# Patient Record
Sex: FEMALE | Race: WHITE | Employment: UNEMPLOYED | URBAN - METROPOLITAN AREA
[De-identification: names, ages, dates, MRNs, and addresses within clinical notes are randomized per-mention and may not be internally consistent; named-entity substitution may affect disease eponyms.]

---

## 2017-01-22 PROBLEM — E89.41 SURGICAL MENOPAUSE, SYMPTOMATIC: Status: ACTIVE | Noted: 2017-01-22

## 2017-01-22 PROBLEM — G89.29 CHRONIC INTRACTABLE HEADACHE: Status: ACTIVE | Noted: 2017-01-22

## 2017-01-22 PROBLEM — R51.9 CHRONIC INTRACTABLE HEADACHE: Status: ACTIVE | Noted: 2017-01-22

## 2017-01-22 PROBLEM — F41.9 ANXIETY: Status: ACTIVE | Noted: 2017-01-22

## 2017-01-22 PROBLEM — G93.2 PSEUDOTUMOR CEREBRI: Status: ACTIVE | Noted: 2017-01-22

## 2017-05-11 ENCOUNTER — ANESTHESIA (OUTPATIENT)
Dept: ENDOSCOPY | Age: 32
End: 2017-05-11
Payer: COMMERCIAL

## 2017-05-11 ENCOUNTER — ANESTHESIA EVENT (OUTPATIENT)
Dept: ENDOSCOPY | Age: 32
End: 2017-05-11
Payer: COMMERCIAL

## 2017-05-11 ENCOUNTER — HOSPITAL ENCOUNTER (OUTPATIENT)
Age: 32
Setting detail: OUTPATIENT SURGERY
Discharge: HOME | End: 2017-05-11
Attending: SURGERY | Admitting: SURGERY
Payer: COMMERCIAL

## 2017-05-11 VITALS
TEMPERATURE: 98.6 F | OXYGEN SATURATION: 95 % | HEIGHT: 68 IN | DIASTOLIC BLOOD PRESSURE: 86 MMHG | BODY MASS INDEX: 33.95 KG/M2 | RESPIRATION RATE: 14 BRPM | WEIGHT: 224 LBS | SYSTOLIC BLOOD PRESSURE: 113 MMHG | HEART RATE: 82 BPM

## 2017-05-11 PROCEDURE — 74011250636 HC RX REV CODE- 250/636: Performed by: ANESTHESIOLOGY

## 2017-05-11 PROCEDURE — 76060000032 HC ANESTHESIA 0.5 TO 1 HR: Performed by: SURGERY

## 2017-05-11 PROCEDURE — 74011250636 HC RX REV CODE- 250/636

## 2017-05-11 PROCEDURE — 76040000025: Performed by: SURGERY

## 2017-05-11 RX ORDER — PROPOFOL 10 MG/ML
INJECTION, EMULSION INTRAVENOUS AS NEEDED
Status: DISCONTINUED | OUTPATIENT
Start: 2017-05-11 | End: 2017-05-11 | Stop reason: HOSPADM

## 2017-05-11 RX ORDER — SODIUM CHLORIDE, SODIUM LACTATE, POTASSIUM CHLORIDE, CALCIUM CHLORIDE 600; 310; 30; 20 MG/100ML; MG/100ML; MG/100ML; MG/100ML
1000 INJECTION, SOLUTION INTRAVENOUS CONTINUOUS
Status: DISCONTINUED | OUTPATIENT
Start: 2017-05-11 | End: 2017-05-11 | Stop reason: HOSPADM

## 2017-05-11 RX ORDER — PROPOFOL 10 MG/ML
INJECTION, EMULSION INTRAVENOUS
Status: DISCONTINUED | OUTPATIENT
Start: 2017-05-11 | End: 2017-05-11 | Stop reason: HOSPADM

## 2017-05-11 RX ADMIN — PROPOFOL 200 MCG/KG/MIN: 10 INJECTION, EMULSION INTRAVENOUS at 14:05

## 2017-05-11 RX ADMIN — PROPOFOL 120 MG: 10 INJECTION, EMULSION INTRAVENOUS at 14:05

## 2017-05-11 RX ADMIN — SODIUM CHLORIDE, SODIUM LACTATE, POTASSIUM CHLORIDE, AND CALCIUM CHLORIDE: 600; 310; 30; 20 INJECTION, SOLUTION INTRAVENOUS at 14:00

## 2017-05-11 RX ADMIN — SODIUM CHLORIDE, SODIUM LACTATE, POTASSIUM CHLORIDE, AND CALCIUM CHLORIDE 1000 ML: 600; 310; 30; 20 INJECTION, SOLUTION INTRAVENOUS at 13:13

## 2017-05-11 NOTE — IP AVS SNAPSHOT
Joanna Foil 
 
 
 [address] [facility] 
[phone] Patient: Ismael Guillen MRN: [id number] EKA:[id number] You are allergic to the following No active allergies Recent Documentation Height Weight Breastfeeding? BMI OB Status Smoking Status 1.727 m 101.6 kg No 34.06 kg/m2 Hysterectomy Never Smoker Emergency Contacts Name Discharge Info Relation Home Work Mobile [facility] DISCHARGE CAREGIVER [3] Spouse [3] [phone] About your hospitalization You were admitted on:  May 11, 2017 You last received care in the:  [facility] You were discharged on:  May 11, 2017 Unit phone number:  [phone] Why you were hospitalized Your primary diagnosis was:  Not on File Providers Seen During Your Hospitalizations Provider Role Specialty Primary office phone Candy Burroughs DO Attending Provider General Surgery [phone] Your Primary Care Physician (PCP) Primary Care Physician Office Phone Office Fax 9115 [address], [address] [phone] Follow-up Information None Your Appointments Wednesday May 24, 2017 10:45 AM EDT  
EMG with MD No Sidhu MD ([address]) [address] [place] [address]  
[phone] Current Discharge Medication List  
  
ASK your doctor about these medications Dose & Instructions Dispensing Information Comments Morning Noon Evening Bedtime  
 butalbital-acetaminophen-caffeine -40 mg per tablet Commonly known as:  Costella Jeans Your last dose was: Your next dose is:    
   
   
 Dose:  1 Tab Take 1 Tab by mouth every six (6) hours as needed for Pain. Max Daily Amount: 4 Tabs. Quantity:  20 Tab Refills:  0  
     
   
   
   
  
 fluconazole 150 mg tablet Commonly known as:  DIFLUCAN Your last dose was: Your next dose is:    
   
   
 Dose:  150 mg Take 1 Tab by mouth daily for 7 days. FDA advises cautious prescribing of oral fluconazole in pregnancy. Quantity:  7 Tab Refills:  0  
     
   
   
   
  
 furosemide 20 mg tablet Commonly known as:  LASIX Your last dose was: Your next dose is:    
   
   
 take 1 tablet by mouth three times a day Refills:  0  
     
   
   
   
  
 hydrocortisone 25 mg Supp Commonly known as:  ANUSOL-HC Your last dose was: Your next dose is:    
   
   
 Dose:  25 mg Insert 1 Suppository into rectum three (3) times daily. Quantity:  21 Suppository Refills:  0 KLOR-CON 10 10 mEq tablet Generic drug:  potassium chloride SR Your last dose was: Your next dose is:    
   
   
 Dose:  10 mEq Take 10 mEq by mouth nightly. Refills:  0  
     
   
   
   
  
 mupirocin calcium 2 % topical cream  
Commonly known as:  [employer] Your last dose was: Your next dose is:    
   
   
 Apply  to affected area two (2) times a day. Quantity:  30 g Refills:  5  
     
   
   
   
  
 nystatin powder Commonly known as:  NYSTOP Your last dose was: Your next dose is:    
   
   
 Apply  to affected area three (3) times daily. Quantity:  60 g Refills:  11  
     
   
   
   
  
 oxyCODONE IR 5 mg immediate release tablet Commonly known as:  Joanne Griggs Your last dose was: Your next dose is:    
   
   
 Dose:  5 mg Take 1 Tab by mouth two (2) times daily as needed for Pain. Max Daily Amount: 10 mg.  
 Quantity:  60 Tab Refills:  0 PREMARIN 1.25 mg tablet Generic drug:  conjugated estrogens Your last dose was: Your next dose is:    
   
   
 Dose:  1.25 mg Take 1 Tab by mouth daily. Quantity:  90 Tab Refills:  1 PriLOSEC 20 mg capsule Generic drug:  omeprazole Your last dose was: Your next dose is:    
   
   
 Dose:  20 mg Take 20 mg by mouth as needed. Refills:  0  
     
   
   
   
  
 promethazine 25 mg tablet Commonly known as:  PHENERGAN Your last dose was: Your next dose is:    
   
   
 Dose:  25 mg Take 1 Tab by mouth every six (6) hours as needed for Nausea. Quantity:  30 Tab Refills:  2 Discharge Instructions Gastrointestinal Colonoscopy/Flexible Sigmoidoscopy - Lower Exam Discharge Instructions 1. Call Dr. Danielle Ordoñez for any problems or questions. 2. Contact the doctors office for follow up appointment as directed 3. Medication may cause drowsiness for several hours, therefore, do not drive or operate machinery for remainder of the day. 4. No alcohol today. 5. Ordinarily, you may resume regular diet and activity after exam unless otherwise specified by your physician. 6. Because of air put into your colon during exam, you may experience some abdominal distension, relieved by the passage of gas, for several hours. 7. Contact your physician if you have any of the following: 
a. Excessive amount of bleeding  large amount when having a bowel movement. Occasional specks of blood with bowel movement would not be unusual. 
b. Severe abdominal pain 
c. Fever or Chills Instructions given to Ginger Sudhakar and other family members. Instructions given by:  Reji Guevara RN Discharge Orders None Introducing [facility]! [employer] introduces [email or web address] patient portal. Now you can access parts of your medical record, email your doctor's office, and request medication refills online. 1. In your internet browser, go to [email or web address] 2. Click on the First Time User? Click Here link in the Sign In box. You will see the New Member Sign Up page. 3. Enter your [employer] Access Code exactly as it appears below. You will not need to use this code after youve completed the sign-up process. If you do not sign up before the expiration date, you must request a new code. · [employer] Access Code: [email or web address] Expires: 7/30/2017  2:03 PM 
 
4. Enter the last four digits of your Social Security Number (xxxx) and Date of Birth (mm/dd/yyyy) as indicated and click Submit. You will be taken to the next sign-up page. 5. Create a [employer] ID. This will be your [employer] login ID and cannot be changed, so think of one that is secure and easy to remember. 6. Create a [employer] password. You can change your password at any time. 7. Enter your Password Reset Question and Answer. This can be used at a later time if you forget your password. 8. Enter your e-mail address. You will receive e-mail notification when new information is available in [address] 9. Click Sign Up. You can now view and download portions of your medical record. 10. Click the Download Summary menu link to download a portable copy of your medical information. If you have questions, please visit the Frequently Asked Questions section of the [employer] website. Remember, [employer] is NOT to be used for urgent needs. For medical emergencies, dial 911. Now available from your iPhone and Android! General Information Please provide this summary of care documentation to your next provider. Patient Signature:  ____________________________________________________________ Date:  ____________________________________________________________  
  
Laureano Willard Provider Signature:  ____________________________________________________________ Date:  ____________________________________________________________

## 2017-05-11 NOTE — PROCEDURES
Procedure in Detail:  Informed consent was obtained for the procedure. The patient was placed in the left lateral decubitus position and sedation was induced by anesthesia. The [id number] was inserted into the rectum and advanced under direct vision to the cecum, which was identified by the ileocecal valve and appendiceal orifice. The quality of the colonic preparation was excellent. A careful inspection was made as the colonoscope was withdrawn, including a retroflexed view of the rectum; findings and interventions are described below. Appropriate photodocumentation was obtained. Findings:   ANUS: Anal exam reveals no masses or hemorrhoids, sphincter tone is normal.   RECTUM: Rectal exam reveals no masses or hemorrhoids. SIGMOID COLON: The mucosa is normal with good vascular pattern and without ulcers, diverticula, and polyps. DESCENDING COLON: The mucosa is normal with good vascular pattern and without ulcers, diverticula, and polyps. SPLENIC FLEXURE: The splenic flexure is normal.   TRANSVERSE COLON: The mucosa is normal with good vascular pattern and without ulcers, diverticula, and polyps. HEPATIC FLEXURE: The hepatic flexure is normal.   ASCENDING COLON: The mucosa is normal with good vascular pattern and without ulcers, diverticula, and polyps. CECUM: The appendiceal orifice appears normal. The ileocecal valve appears normal.   TERMINAL ILEUM: The terminal ileum was not entered. Grade one hemorrhoids without bleeding. Specimens: No specimens were collected. Complications: None; patient tolerated the procedure well. \    EBL - none    Recommendations:   - Follow clinical symptoms and laboratory studies for evidence of rebleeding.    - Follow up with primary care physician.      Signed By: Neeta Enriquez DO                        May 11, 2017

## 2017-05-11 NOTE — ANESTHESIA PREPROCEDURE EVALUATION
Anesthetic History   No history of anesthetic complications            Review of Systems / Medical History  Patient summary reviewed, nursing notes reviewed and pertinent labs reviewed    Pulmonary  Within defined limits                 Neuro/Psych   Within defined limits           Cardiovascular                  Exercise tolerance: >4 METS     GI/Hepatic/Renal     GERD: well controlled           Endo/Other  Within defined limits           Other Findings              Physical Exam    Airway  Mallampati: II  TM Distance: 4 - 6 cm  Neck ROM: normal range of motion   Mouth opening: Normal     Cardiovascular    Rhythm: regular           Dental  No notable dental hx       Pulmonary  Breath sounds clear to auscultation               Abdominal         Other Findings            Anesthetic Plan    ASA: 2  Anesthesia type: total IV anesthesia            Anesthetic plan and risks discussed with: Patient

## 2017-05-11 NOTE — H&P
Benjamin Sandhu DO  [address], [address] [facility], [address] [address]  Phone [phone] Fax [phone]        Date of visit: 17       Primary/Requesting provider: Kira Santos MD          Chief Complaint   Patient presents with    New Patient       rectal bleeding                    Date:17          Name: Tori Tripp    MRN: [id number]   : 1985    Age: 29 y.o. Sex: female      PCP: Kira Santos MD         CC:        Chief Complaint   Patient presents with    New Patient       rectal bleeding          HPI:     Tori Tripp is a 29 y.o. female who presents for evaluation for rectal bleeding. This is a young healthy 82-year-old complaining of rectal bleeding for 8 months. She has a history of urinary incontinence and has had workup and evaluation including cystoscopies and colonoscopy in . She reports no abnormalities on the colonoscopy. She states that for the past 8 months she has been bleeding 3-4 times a week while having bowel movements. She reports the blood is mostly seen on the tissue paper but not as much in the toilet water. She has had no jeffry blood or clots noted in the toilet water. She denies any signs or symptoms of anemia including postural hypertension dizziness or syncopal episodes. She reports hemorrhoids when she was pregnant but does not have any complaints of hemorrhoids on today's evaluation. She denies rectal pain or burning. She does complain of cramping that resolves after bowel movement. She reports a normal appetite she denies any changes in her bowel habits specifically denying constipation and diarrhea. She denies any unusual weight loss.  She denies any fevers nausea or vomiting.     PMH:          Past Medical History:   Diagnosis Date    Allergic rhinitis      Anxiety      Depression      GERD (gastroesophageal reflux disease)      History of peptic ulcer      Migraines      Personal history of urinary calculi         PSH:           Past Surgical History:   Procedure Laterality Date    HX CHOLECYSTECTOMY   2013    HX COLONOSCOPY   2009     [place]--no polyps    HX HYSTERECTOMY   2012     partial    HX LAP CHOLECYSTECTOMY        HX PARTIAL HYSTERECTOMY         with left oophorectomy         MEDS:          Current Outpatient Prescriptions   Medication Sig    hydrocortisone (ANUSOL-HC) 25 mg supp Insert 1 Suppository into rectum three (3) times daily.  PREMARIN 1.25 mg tablet      diazePAM (VALIUM) 5 mg tablet Take 1 Tab by mouth every twelve (12) hours as needed for Anxiety. Max Daily Amount: 10 mg.    [START ON 4/3/2017] oxyCODONE IR (ROXICODONE) 5 mg immediate release tablet Take 1 Tab by mouth two (2) times daily as needed for Pain. Max Daily Amount: 10 mg.    gabapentin (NEURONTIN) 100 mg capsule Take by mouth two (2) times a day.  potassium chloride SR (KLOR-CON 10) 10 mEq tablet Take 10 mEq by mouth daily.  promethazine (PHENERGAN) 25 mg tablet Take 1 Tab by mouth every six (6) hours as needed for Nausea.  clotrimazole-betamethasone (LOTRISONE) topical cream Apply to affected area three (3) times daily.  butalbital-acetaminophen-caffeine (FIORICET, ESGIC) -40 mg per tablet Take 1 Tab by mouth every six (6) hours as needed for Pain. Max Daily Amount: 4 Tabs.  montelukast (SINGULAIR) 10 mg tablet Take 10 mg by mouth daily.  tiotropium bromide (SPIRIVA RESPIMAT) 2.5 mcg/actuation mist Take 2 Puffs by inhalation daily.  fluticasone-vilanterol (BREO ELLIPTA) 200-25 mcg/dose dsdv Take 1 Puff by inhalation daily.      No current facility-administered medications for this visit.          ALLERGIES:      No Known Allergies     SH:          Social History   Substance Use Topics    Smoking status: Never Smoker    Smokeless tobacco: Never Used    Alcohol use No         FH:     No family history on file.        Review of Systems   Constitutional: Negative. HENT: Negative.    Eyes: Negative. Respiratory: Negative. Cardiovascular: Negative. Gastrointestinal: Positive for abdominal pain, blood in stool, diarrhea and heartburn. Genitourinary: Negative. Musculoskeletal: Negative. Neurological: Negative. Endo/Heme/Allergies: Negative. Psychiatric/Behavioral: Negative.            Physical Exam:           Visit Vitals    /85    Pulse (!) 116    Ht 5' 7\" (1.702 m)    Wt 220 lb (99.8 kg)    BMI 34.46 kg/m2            Physical Exam   Constitutional: She is oriented to person, place, and time and well-developed, well-nourished, and in no distress. HENT:   Head: Normocephalic and atraumatic. Mouth/Throat: Oropharynx is clear and moist.   Eyes: EOM are normal. Pupils are equal, round, and reactive to light. Neck: Normal range of motion. Neck supple. No tracheal deviation present. No thyromegaly present. Cardiovascular: Normal rate, regular rhythm and normal heart sounds. No murmur heard. Pulmonary/Chest: Effort normal and breath sounds normal. No respiratory distress. She has no wheezes. She has no rales. Abdominal: Soft. Bowel sounds are normal. She exhibits no distension and no mass. There is no tenderness. There is no rebound and no guarding. Musculoskeletal: Normal range of motion. She exhibits no edema. Neurological: She is alert and oriented to person, place, and time. Skin: Skin is warm and dry. No erythema. Psychiatric: Mood and judgment normal.    rectal exam: Externally there is a skin tag at the 6 o'clock position. There is no evidence of fissure or bleeding. Rectal tone is normal. There is no palpable mass. No blood noted today.     Assessment/Plan: Yonatan Meredith is a 29 y.o. female who has signs and symptoms consistent with rectal bleeding. I have recommended a colonoscopy. I discussed the potential risks and benefits and potential complications with the patient today in the office.  I also briefly discussed risk and complications with anesthesia. We will be scheduling her for a colonoscopy in the near future. Bowel prep was explained.         ICD-10-CM ICD-9-CM     1. Rectal bleeding K62.5 569. 3           I went through the risks of bleeding, infection and anesthesia.      Counseling time:not applicable     Signed: Jimmy Cason DO

## 2017-05-11 NOTE — DISCHARGE INSTRUCTIONS
Gastrointestinal Colonoscopy/Flexible Sigmoidoscopy - Lower Exam Discharge Instructions  1. Call Dr. Linda Kyle for any problems or questions. 2. Contact the doctors office for follow up appointment as directed  3. Medication may cause drowsiness for several hours, therefore, do not drive or operate machinery for remainder of the day. 4. No alcohol today. 5. Ordinarily, you may resume regular diet and activity after exam unless otherwise specified by your physician. 6. Because of air put into your colon during exam, you may experience some abdominal distension, relieved by the passage of gas, for several hours. 7. Contact your physician if you have any of the following:  a. Excessive amount of bleeding - large amount when having a bowel movement. Occasional specks of blood with bowel movement would not be unusual.  b. Severe abdominal pain  c. Fever or Chills       Instructions given to Nat Ben and other family members.   Instructions given by:  Dennys Bueno RN

## 2017-05-16 NOTE — ANESTHESIA POSTPROCEDURE EVALUATION
Post-Anesthesia Evaluation and Assessment    Patient: Simuel Duverney MRN: [id number]  SSN: [id number]    YOB: 1985  Age: 29 y.o. Sex: female       Cardiovascular Function/Vital Signs  Visit Vitals    /86    Pulse 82    Temp 37 °C (98.6 °F)    Resp 14    Ht 5' 8\" (1.727 m)    Wt 101.6 kg (224 lb)    SpO2 95%    Breastfeeding No    BMI 34.06 kg/m2       Patient is status post total IV anesthesia anesthesia for Procedure(s):  COLONOSCOPY/ 34.    Nausea/Vomiting: None    Postoperative hydration reviewed and adequate. Pain:  Pain Scale 1: Numeric (0 - 10) (05/11/17 1448)  Pain Intensity 1: 0 (05/11/17 1448)   Managed    Neurological Status: At baseline    Mental Status and Level of Consciousness: Arousable    Pulmonary Status:   O2 Device: Room air (05/11/17 1448)   Adequate oxygenation and airway patent    Complications related to anesthesia: None    Post-anesthesia assessment completed.  No concerns    Signed By: Kalin Oconnell MD     May 15, 2017

## 2017-10-13 PROBLEM — F33.41 RECURRENT MAJOR DEPRESSIVE DISORDER, IN PARTIAL REMISSION (HCC): Status: ACTIVE | Noted: 2017-10-13

## 2018-03-13 ENCOUNTER — HOSPITAL ENCOUNTER (OUTPATIENT)
Dept: PHYSICAL THERAPY | Age: 33
Discharge: HOME | End: 2018-03-13
Payer: COMMERCIAL

## 2018-03-13 DIAGNOSIS — M79.601 PAIN IN INFERIOR RIGHT UPPER EXTREMITY: ICD-10-CM

## 2018-03-13 DIAGNOSIS — M54.2 NECK PAIN: ICD-10-CM

## 2018-03-13 PROCEDURE — 97162 PT EVAL MOD COMPLEX 30 MIN: CPT

## 2018-03-13 NOTE — THERAPY EVALUATION
Libertad Mar  : 1985  Primary: [facility]  Secondary:  [facility] [place]  at [address], [place], [address] [place] [address]  [phone]   [phone]       OUTPATIENT PHYSICAL THERAPY:Initial Assessment 3/13/2018    ICD-10: Treatment Diagnosis: Neck pain (M54.2)  Treatment diagnosis 2: Pain in right arm (M79.601)  Treatment diagnosis 3: Cervical radiculopathy (M54.12)  Precautions:  shunt placement  Allergies: Review of patient's allergies indicates no known allergies. Fall Risk Score: 1 (? 5 = High Risk)  MD Orders: evaluate and treat  MEDICAL/REFERRING DIAGNOSIS:  Neck pain [M54.2]  Pain in inferior right upper extremity [M79.601]   DATE OF ONSET: Patient reports neck pain for years  REFERRING PHYSICIAN: Katrina Pelayo MD  RETURN PHYSICIAN APPOINTMENT: No follow up scheduled     INITIAL ASSESSMENT:  Ms. Dirk Mcqueen is a 34 y.o. female presenting to physical therapy with complaints of neck pain with radiating pain in right upper extremity. Patient reports pain in neck and arm for years and finally had a diagnosis of pseudotumor cerebri in 2017. Patient then had a  shunt placement in 2017 to relieve pressure. Patient reports no significant relief since shunt placement and continues to report pain in cervical spine, right upper extremity, and daily migraines/headaches. Patient reports not having an MRI to cervical spine. Pain is located in lower cervical and upper thoracic spine and increases with left rotation, and cervical flexion and extension. Pain also radiates into right upper extremity down to the hand. Pain is 5/10 at best and 7/10 at worst.  Primary goal is to reduce pain with ADL's. Patient is a good candidate for skilled physical therapy services to include manual therapy, therapeutic exercise, and pain modalities as needed. PROBLEM LIST (Impacting functional limitations):  1. Decreased Strength  2.  Decreased ADL/Functional Activities  3. Increased Pain  4. Decreased Activity Tolerance  5. Increased Fatigue  6. Decreased Flexibility/Joint Mobility INTERVENTIONS PLANNED:  1. Cold  2. Cryotherapy  3. Electrical Stimulation  4. Heat  5. Home Exercise Program (HEP)  6. Manual Therapy  7. Neuromuscular Re-education/Strengthening  8. Range of Motion (ROM)  9. Therapeutic Activites  10. Therapeutic Exercise/Strengthening  11. Transcutaneous Electrical Nerve Stimulation (TENS)  12. Ultrasound (US)  13. Therapuetic dry needling   TREATMENT PLAN:  Effective Dates: 3/13/18 TO 4/10/18. Frequency/Duration: 2 times a week for 4 weeks  GOALS: (Goals have been discussed and agreed upon with patient.)  Short-Term Functional Goals: Time Frame: 3/13/18 to 3/27/18  1. Patient will be independent with home program to improve posture and head/neck position. 2. Patient will report no more than 7/10 pain in cervical spine with ADL's. Discharge Goals: Time Frame: 3/13/18 to 4/10/18  1. Patient will report no more than 4/10 pain in cervical spine with ADL's.  2. Patient will report no more than 3/10 pain in right upper extremity with ADL's.  3. Patient will increase left cervical rotation to 55 degrees to increase tolerance for driving and head turning. 4. Patient will report no more than 2/10 pain in cervical spine with all active cervical motions. 5. Patient will increase  strength in right hand to 35 lbs to increase functional use of upper extremity. 6. Patient will improve Neck disability index score to 20/50 from 38/50. Rehabilitation Potential For Stated Goals: Good  Regarding Merline Rawls's therapy, I certify that the treatment plan above will be carried out by a therapist or under their direction. Thank you for this referral,  Carey Cade PT     Referring Physician Signature: Ronna Abbott MD              Date                    The information in this section was collected on 3/13/18 (except where otherwise noted).   HISTORY: History of Present Injury/Illness (Reason for Referral):  Ms. Jatinder Sosa is a 34 y.o. female presenting to physical therapy with complaints of neck pain with radiating pain in right upper extremity. Patient reports pain in neck and arm for years and finally had a diagnosis of pseudotumor cerebri in 2017. Patient then had a  shunt placement in August 2017 to relieve pressure. Patient reports no significant relief since shunt placement and continues to report pain in cervical spine, right upper extremity, and daily migraines/headaches. Patient reports not having an MRI to cervical spine. Pain is located in lower cervical and upper thoracic spine and increases with left rotation, and cervical flexion and extension. Pain also radiates into right upper extremity down to the hand. Pain is 5/10 at best and 7/10 at worst.  Primary goal is to reduce pain with ADL's. Past Medical History/Comorbidities:   Ms. Jatinder Sosa  has a past medical history of Allergic rhinitis; Anxiety; Depression; GERD (gastroesophageal reflux disease); History of peptic ulcer; Migraines; Ovarian torsion; and Personal history of urinary calculi. Ms. Jatinder Sosa  has a past surgical history that includes hx lap cholecystectomy; hx partial hysterectomy; hx colonoscopy (2009); hx hysterectomy (2012); hx cholecystectomy (2013); colonoscopy (N/A, 5/11/2017); and hx other surgical (09/2017). Social History/Living Environment:     Lives at home with family  Prior Level of Function/Work/Activity:  Not working. Mother of 2 children (ages 6 and 8)  Dominant Side:         RIGHT  Current Medications:       Current Outpatient Prescriptions:     clonazePAM (KLONOPIN) 2 mg tablet, Take 1 Tab by mouth daily as needed. , Disp: 30 Tab, Rfl: 0    diphenoxylate-atropine (LOMOTIL) 2.5-0.025 mg per tablet, Take 1-2 Tabs by mouth four (4) times daily as needed for Diarrhea.  Max Daily Amount: 8 Tabs., Disp: 30 Tab, Rfl: 0    oxyCODONE IR (OXY-IR) 15 mg immediate release tablet, Take 1 Tab by mouth three (3) times daily as needed for Pain. Max Daily Amount: 45 mg., Disp: 75 Tab, Rfl: 0    topiramate (TOPAMAX) 100 mg tablet, 1 in am and 1 at night to prevent headaches, Disp: 75 Tab, Rfl: 5    tiZANidine (ZANAFLEX) 4 mg tablet, Take 1 Tab by mouth nightly. Sleep/chronic headaches/neck pain, Disp: 30 Tab, Rfl: 2    DULoxetine (CYMBALTA) 30 mg capsule, Take 1 Cap by mouth daily. , Disp: 30 Cap, Rfl: 2    promethazine (PHENERGAN) 25 mg tablet, Take 1 Tab by mouth every six (6) hours as needed for Nausea., Disp: 60 Tab, Rfl: 1    PREMARIN 1.25 mg tablet, Take 1 Tab by mouth daily. , Disp: 30 Tab, Rfl: 11    naproxen sodium (ALEVE) 220 mg tablet, Take 2 Tabs by mouth two (2) times daily (with meals). To prevent and/or treat migraines, Disp: 120 Tab, Rfl: 0    nystatin (NYSTOP) powder, Apply  to affected area three (3) times daily. , Disp: 60 g, Rfl: 11    Current Facility-Administered Medications:     promethazine (PHENERGAN) injection 50 mg, 50 mg, IntraMUSCular, Q6H PRN, Georgia Starks MD, 50 mg at 17 1128   Date Last Reviewed:  3/13/2018   Number of Personal Factors/Comorbidities that affect the Plan of Care:  (chronic pain, pseudotumor cerebri) 1-2: MODERATE COMPLEXITY   EXAMINATION:   Observation/Orthostatic Postural Assessment:          Patient presents with mild forward head and rounded shoulders posture. Patient presents with well healed scar over right ear where  shunt was placed. Palpation:          Tender at bilateral upper trapezius, left cervical paraspinals, and left suboccipital muscles. ROM:            Upper extremity range of motion is normal.    Cervical AROM: (in degrees)  Rotation: left=40 with pain in left cervical spine, right=55  Side bendin bilaterally  Flexion:25 with pain in cervical spine and down right arm  Extension: 15 with pain down right arm    Strength:            Gross strength in bilateral shoulders is 4/5.   Elbow flexion: left=5/5, right=4/5     strength: left=45 lbs, right=10 lbs    Special Tests:            Cervical compresion=no change in symptoms  Cervical traction=no change in symptoms  Spurling's test=postive on left, negative on right    Neurological Screen:        Myotomes:  Bilateral upper extremities are normal        Dermatomes:  Intact for light touch and sensation    Functional Mobility:         Transfers:  Minimal difficulty        Bed Mobility:  Minimal difficulty     Body Structures Involved:  1. Bones  2. Joints  3. Muscles Body Functions Affected:  1. Sensory/Pain  2. Neuromusculoskeletal  3. Movement Related Activities and Participation Affected:  1. General Tasks and Demands  2. Mobility  3. Self Care  4. Domestic Life  5. Community, Social and [place]   Number of elements (examined above) that affect the Plan of Care: 3: MODERATE COMPLEXITY   CLINICAL PRESENTATION:   Presentation: Evolving clinical presentation with changing clinical characteristics: MODERATE COMPLEXITY   CLINICAL DECISION MAKING:   Outcome Measure: Tool Used: Neck Disability Index (NDI)  Score:  Initial: 38/50  Most Recent: X/50 (Date: -- )   Interpretation of Score: The Neck Disability Index is a revised form of the Oswestry Low Back Pain Index and is designed to measure the activities of daily living in person's with neck pain. Each section is scored on a 0-5 scale, 5 representing the greatest disability. The scores of each section are added together for a total score of 50. Score 0 1-10 11-20 21-30 31-40 41-49 50   Modifier CH CI CJ CK CL CM CN       Medical Necessity:   · Patient is expected to demonstrate progress in strength, range of motion, coordination and functional technique to decrease pain and increase tolerance for ADL's. · Skilled intervention continues to be required due to pain and limitation in cervical spine and right upper extremity.   Reason for Services/Other Comments:  · Patient continues to require skilled intervention due to pain and limitation in cervical spine and right upper extremity. Use of outcome tool(s) and clinical judgement create a POC that gives a:  (chronic pain, mild co-morbidities, marked pain, significant limitation on outcome measure) Questionable prediction of patient's progress: MODERATE COMPLEXITY            TREATMENT:   (In addition to Assessment/Re-Assessment sessions the following treatments were rendered)  Pre-treatment Symptoms/Complaints:  Patient reports chronic pain in cervical spine with radiating pain and weakness in right upper extremity. Pain is constant and limits head movements with ADL's. Pain: Initial:   Pain Intensity 1: 6  Pain Location 1: Spine, cervical  Pain Orientation 1: Mid, Left  Post Session:  Patient reports 6/10 pain     Therapeutic Exercise: ( ):  Exercises per grid below to improve mobility, strength, coordination and dynamic movement of neck - generalized to improve functional bending, carrying and head/neck movements. Required minimal visual, verbal and manual cues to promote proper body alignment, promote proper body posture and promote proper body mechanics. Progressed resistance, range and repetitions as indicated. Date:  3/13/18 Date:   Date:     Activity/Exercise Parameters Parameters Parameters   Chin tucks [id number], supine                                             Manual Therapy (     ): None today    Therapeutic Modalities: for pain and edema                                                                                               HEP: As above; handouts given to patient for all exercises. Treatment/Session Assessment:    · Response to Treatment:  Marked tenderness noted in bilateral upper trapezius and left cervical paraspinals and suboccipitals. Patient showed good understanding of home program.  · Compliance with Program/Exercises: compliant most of the time. · Recommendations/Intent for next treatment session:  \"Next visit will focus on advancements to more challenging activities\".     Total Treatment Duration: 45 minutes    PT Patient Time In/Time Out  Time In: 1000  Time Out: [address], PT

## 2018-03-13 NOTE — PROGRESS NOTES
Ambulatory/Rehab Services H2 Model Falls Risk Assessment    Risk Factor Pts. ·   Confusion/Disorientation/Impulsivity  []    4 ·   Symptomatic Depression  []   2 ·   Altered Elimination  []   1 ·   Dizziness/Vertigo  []   1 ·   Gender (Male)  []   1 ·   Any administered antiepileptics (anticonvulsants):  []   2 ·   Any administered benzodiazepines:  []   1 ·   Visual Impairment (specify):  []   1 ·   Portable Oxygen Use  []   1 ·   Orthostatic ? BP  []   1 ·   History of Recent Falls (within 3 mos.)  []   5     Ability to Rise from Chair (choose one) Pts. ·   Ability to rise in a single movement  []   0 ·   Pushes up, successful in one attempt  [x]   1 ·   Multiple attempts, but successful  []   3 ·   Unable to rise without assistance  []   4   Total: (5 or greater = High Risk) 1     Falls Prevention Plan:   []                Physical Limitations to Exercise (specify):   []                Mobility Assistance Device (type):   []                Exercise/Equipment Adaptation (specify):    ©2010 [facility] Patent #[id number].  Federal Law prohibits the replication, distribution or use without written permission from [facility]

## 2018-03-14 ENCOUNTER — HOSPITAL ENCOUNTER (OUTPATIENT)
Dept: PHYSICAL THERAPY | Age: 33
Discharge: HOME | End: 2018-03-14
Payer: COMMERCIAL

## 2018-03-14 PROCEDURE — 97140 MANUAL THERAPY 1/> REGIONS: CPT

## 2018-03-14 PROCEDURE — 97110 THERAPEUTIC EXERCISES: CPT

## 2018-03-14 NOTE — PROGRESS NOTES
Farrah Orlando  : 1985  Primary: [facility]  Secondary:  [address]  at [place] [address], [place], [address]  [phone]   [phone]       OUTPATIENT PHYSICAL THERAPY:Daily Note 3/14/2018    ICD-10: Treatment Diagnosis: Neck pain (M54.2)  Treatment diagnosis 2: Pain in right arm (M79.601)  Treatment diagnosis 3: Cervical radiculopathy (M54.12)  Precautions:  shunt placement  Allergies: Review of patient's allergies indicates no known allergies. Fall Risk Score: 1 (? 5 = High Risk)  MD Orders: evaluate and treat  MEDICAL/REFERRING DIAGNOSIS:  Cervicalgia [M54.2]  Pain in right arm [M79.601]   DATE OF ONSET: Patient reports neck pain for years  REFERRING PHYSICIAN: Cathy Mcclure MD  RETURN PHYSICIAN APPOINTMENT: No follow up scheduled     INITIAL ASSESSMENT:  Ms. Shailesh Segundo is a 34 y.o. female presenting to physical therapy with complaints of neck pain with radiating pain in right upper extremity. Patient reports pain in neck and arm for years and finally had a diagnosis of pseudotumor cerebri in . Patient then had a  shunt placement in 2017 to relieve pressure. Patient reports no significant relief since shunt placement and continues to report pain in cervical spine, right upper extremity, and daily migraines/headaches. Patient reports not having an MRI to cervical spine. Pain is located in lower cervical and upper thoracic spine and increases with left rotation, and cervical flexion and extension. Pain also radiates into right upper extremity down to the hand. Pain is 5/10 at best and 7/10 at worst.  Primary goal is to reduce pain with ADL's. Patient is a good candidate for skilled physical therapy services to include manual therapy, therapeutic exercise, and pain modalities as needed. PROBLEM LIST (Impacting functional limitations):  1. Decreased Strength  2. Decreased ADL/Functional Activities  3.  Increased Pain  4. Decreased Activity Tolerance  5. Increased Fatigue  6. Decreased Flexibility/Joint Mobility INTERVENTIONS PLANNED:  1. Cold  2. Cryotherapy  3. Electrical Stimulation  4. Heat  5. Home Exercise Program (HEP)  6. Manual Therapy  7. Neuromuscular Re-education/Strengthening  8. Range of Motion (ROM)  9. Therapeutic Activites  10. Therapeutic Exercise/Strengthening  11. Transcutaneous Electrical Nerve Stimulation (TENS)  12. Ultrasound (US)  13. Therapuetic dry needling   TREATMENT PLAN:  Effective Dates: 3/13/18 TO 4/10/18. Frequency/Duration: 2 times a week for 4 weeks  GOALS: (Goals have been discussed and agreed upon with patient.)  Short-Term Functional Goals: Time Frame: 3/13/18 to 3/27/18  1. Patient will be independent with home program to improve posture and head/neck position. 2. Patient will report no more than 7/10 pain in cervical spine with ADL's. Discharge Goals: Time Frame: 3/13/18 to 4/10/18  1. Patient will report no more than 4/10 pain in cervical spine with ADL's.  2. Patient will report no more than 3/10 pain in right upper extremity with ADL's.  3. Patient will increase left cervical rotation to 55 degrees to increase tolerance for driving and head turning. 4. Patient will report no more than 2/10 pain in cervical spine with all active cervical motions. 5. Patient will increase  strength in right hand to 35 lbs to increase functional use of upper extremity. 6. Patient will improve Neck disability index score to 20/50 from 38/50. Rehabilitation Potential For Stated Goals: Good  Regarding Merline Rawls's therapy, I certify that the treatment plan above will be carried out by a therapist or under their direction. Thank you for this referral,  Anitra Stewart PT     Referring Physician Signature: Cathy Mcclure MD              Date                    The information in this section was collected on 3/13/18 (except where otherwise noted).   HISTORY:   History of Present Injury/Illness (Reason for Referral):  Ms. Zaki Humphries is a 34 y.o. female presenting to physical therapy with complaints of neck pain with radiating pain in right upper extremity. Patient reports pain in neck and arm for years and finally had a diagnosis of pseudotumor cerebri in 2017. Patient then had a  shunt placement in August 2017 to relieve pressure. Patient reports no significant relief since shunt placement and continues to report pain in cervical spine, right upper extremity, and daily migraines/headaches. Patient reports not having an MRI to cervical spine. Pain is located in lower cervical and upper thoracic spine and increases with left rotation, and cervical flexion and extension. Pain also radiates into right upper extremity down to the hand. Pain is 5/10 at best and 7/10 at worst.  Primary goal is to reduce pain with ADL's. Past Medical History/Comorbidities:   Ms. Zaki Humphries  has a past medical history of Allergic rhinitis; Anxiety; Depression; GERD (gastroesophageal reflux disease); History of peptic ulcer; Migraines; Ovarian torsion; and Personal history of urinary calculi. Ms. Zaki Humphries  has a past surgical history that includes hx lap cholecystectomy; hx partial hysterectomy; hx colonoscopy (2009); hx hysterectomy (2012); hx cholecystectomy (2013); colonoscopy (N/A, 5/11/2017); and hx other surgical (09/2017). Social History/Living Environment:     Lives at home with family  Prior Level of Function/Work/Activity:  Not working. Mother of 2 children (ages 6 and 8)  Dominant Side:         RIGHT  Current Medications:       Current Outpatient Prescriptions:     clonazePAM (KLONOPIN) 2 mg tablet, Take 1 Tab by mouth daily as needed. , Disp: 30 Tab, Rfl: 0    diphenoxylate-atropine (LOMOTIL) 2.5-0.025 mg per tablet, Take 1-2 Tabs by mouth four (4) times daily as needed for Diarrhea.  Max Daily Amount: 8 Tabs., Disp: 30 Tab, Rfl: 0    oxyCODONE IR (OXY-IR) 15 mg immediate release tablet, Take 1 Tab by mouth three (3) times daily as needed for Pain. Max Daily Amount: 45 mg., Disp: 75 Tab, Rfl: 0    topiramate (TOPAMAX) 100 mg tablet, 1 in am and 1 at night to prevent headaches, Disp: 75 Tab, Rfl: 5    tiZANidine (ZANAFLEX) 4 mg tablet, Take 1 Tab by mouth nightly. Sleep/chronic headaches/neck pain, Disp: 30 Tab, Rfl: 2    DULoxetine (CYMBALTA) 30 mg capsule, Take 1 Cap by mouth daily. , Disp: 30 Cap, Rfl: 2    promethazine (PHENERGAN) 25 mg tablet, Take 1 Tab by mouth every six (6) hours as needed for Nausea., Disp: 60 Tab, Rfl: 1    PREMARIN 1.25 mg tablet, Take 1 Tab by mouth daily. , Disp: 30 Tab, Rfl: 11    naproxen sodium (ALEVE) 220 mg tablet, Take 2 Tabs by mouth two (2) times daily (with meals). To prevent and/or treat migraines, Disp: 120 Tab, Rfl: 0    nystatin (NYSTOP) powder, Apply  to affected area three (3) times daily. , Disp: 60 g, Rfl: 11    Current Facility-Administered Medications:     promethazine (PHENERGAN) injection 50 mg, 50 mg, IntraMUSCular, Q6H PRN, Leroy Elias MD, 50 mg at 17 1128   Date Last Reviewed:  3/14/2018   Number of Personal Factors/Comorbidities that affect the Plan of Care:  (chronic pain, pseudotumor cerebri) 1-2: MODERATE COMPLEXITY   EXAMINATION:   Observation/Orthostatic Postural Assessment:          Patient presents with mild forward head and rounded shoulders posture. Patient presents with well healed scar over right ear where  shunt was placed. Palpation:          Tender at bilateral upper trapezius, left cervical paraspinals, and left suboccipital muscles. ROM:            Upper extremity range of motion is normal.    Cervical AROM: (in degrees)  Rotation: left=40 with pain in left cervical spine, right=55  Side bendin bilaterally  Flexion:25 with pain in cervical spine and down right arm  Extension: 15 with pain down right arm    Strength:            Gross strength in bilateral shoulders is 4/5.   Elbow flexion: left=5/5, right=4/5     strength: left=45 lbs, right=10 lbs    Special Tests:            Cervical compresion=no change in symptoms  Cervical traction=no change in symptoms  Spurling's test=postive on left, negative on right    Neurological Screen:        Myotomes:  Bilateral upper extremities are normal        Dermatomes:  Intact for light touch and sensation    Functional Mobility:         Transfers:  Minimal difficulty        Bed Mobility:  Minimal difficulty     Body Structures Involved:  1. Bones  2. Joints  3. Muscles Body Functions Affected:  1. Sensory/Pain  2. Neuromusculoskeletal  3. Movement Related Activities and Participation Affected:  1. General Tasks and Demands  2. Mobility  3. Self Care  4. Domestic Life  5. Community, Social and [place]   Number of elements (examined above) that affect the Plan of Care: 3: MODERATE COMPLEXITY   CLINICAL PRESENTATION:   Presentation: Evolving clinical presentation with changing clinical characteristics: MODERATE COMPLEXITY   CLINICAL DECISION MAKING:   Outcome Measure: Tool Used: Neck Disability Index (NDI)  Score:  Initial: 38/50  Most Recent: X/50 (Date: -- )   Interpretation of Score: The Neck Disability Index is a revised form of the Oswestry Low Back Pain Index and is designed to measure the activities of daily living in person's with neck pain. Each section is scored on a 0-5 scale, 5 representing the greatest disability. The scores of each section are added together for a total score of 50. Score 0 1-10 11-20 21-30 31-40 41-49 50   Modifier CH CI CJ CK CL CM CN       Medical Necessity:   · Patient is expected to demonstrate progress in strength, range of motion, coordination and functional technique to decrease pain and increase tolerance for ADL's. · Skilled intervention continues to be required due to pain and limitation in cervical spine and right upper extremity.   Reason for Services/Other Comments:  · Patient continues to require skilled intervention due to pain and limitation in cervical spine and right upper extremity. Use of outcome tool(s) and clinical judgement create a POC that gives a:  (chronic pain, mild co-morbidities, marked pain, significant limitation on outcome measure) Questionable prediction of patient's progress: MODERATE COMPLEXITY            TREATMENT:   (In addition to Assessment/Re-Assessment sessions the following treatments were rendered)  Pre-treatment Symptoms/Complaints:  Patient reports moderate to marked pain in cervical spine this morning. Pain: Initial:   Pain Intensity 1: 5  Pain Location 1: Spine, cervical  Pain Orientation 1: Mid  Post Session:  Patient reports 5/10 pain     Therapeutic Exercise: (25 Minutes):  Exercises per grid below to improve mobility, strength, coordination and dynamic movement of neck - generalized to improve functional bending, carrying and head/neck movements. Required minimal visual, verbal and manual cues to promote proper body alignment, promote proper body posture and promote proper body mechanics. Progressed resistance, range and repetitions as indicated. Date:  3/13/18 Date:  3/14/18 Date:     Activity/Exercise Parameters Parameters Parameters   Chin tucks [id number], supine [id number], supine    Rhomboid stretch  [id number]    Upper trap stretch  [id number], right    Doorway stretch, low  [id number]    Low rows  2x10, green                    Manual Therapy (    Soft Tissue Mobilization Duration  Duration: 20 Minutes): Soft tissue mobilization to bilateral upper trapezius and thoracic paraspinals in prone and left suboccipitals and cervical paraspinals in supine to decrease pain and irritation. Manual stretch into cervical side bending in supine to increase range of motion and mobility.     Therapeutic Modalities: for pain and edema               Cervical Spine Heat  Type: Moist pack  Duration: 10 minutes  Patient Position: Supine                                                                               HEP: As above; handouts given to patient for all exercises. Treatment/Session Assessment:    · Response to Treatment:  Patient reported increased symptoms in right hand with right cervical side bending motions but reports no issues with all other exercises and stretches. Updated HEP given to patient. · Recommendations/Intent for next treatment session: \"Next visit will focus on advancements to more challenging activities\".     Total Treatment Duration: 55 minutes    PT Patient Time In/Time Out  Time In: 0800  Time Out: Pr-2 Rodgers By Magy Guerrero, PT

## 2018-03-19 ENCOUNTER — HOSPITAL ENCOUNTER (OUTPATIENT)
Dept: PHYSICAL THERAPY | Age: 33
Discharge: HOME | End: 2018-03-19
Payer: COMMERCIAL

## 2018-03-22 ENCOUNTER — HOSPITAL ENCOUNTER (OUTPATIENT)
Dept: PHYSICAL THERAPY | Age: 33
Discharge: HOME | End: 2018-03-22
Payer: COMMERCIAL

## 2018-03-26 ENCOUNTER — HOSPITAL ENCOUNTER (OUTPATIENT)
Dept: PHYSICAL THERAPY | Age: 33
Discharge: HOME | End: 2018-03-26
Payer: COMMERCIAL

## 2018-03-26 PROCEDURE — 97110 THERAPEUTIC EXERCISES: CPT

## 2018-03-26 PROCEDURE — 97140 MANUAL THERAPY 1/> REGIONS: CPT

## 2018-03-26 NOTE — PROGRESS NOTES
Dawood Lna  : 1985  Primary: [facility]  Secondary:  [address]  at [address], [address]  [phone]   [phone]       OUTPATIENT PHYSICAL THERAPY:Daily Note 3/26/2018    ICD-10: Treatment Diagnosis: Neck pain (M54.2)  Treatment diagnosis 2: Pain in right arm (M79.601)  Treatment diagnosis 3: Cervical radiculopathy (M54.12)  Precautions:  shunt placement  Allergies: Review of patient's allergies indicates no known allergies. Fall Risk Score: 1 (? 5 = High Risk)  MD Orders: evaluate and treat  MEDICAL/REFERRING DIAGNOSIS:  Cervicalgia [M54.2]  Pain in right arm [M79.601]   DATE OF ONSET: Patient reports neck pain for years  REFERRING PHYSICIAN: Marie Juan MD  RETURN PHYSICIAN APPOINTMENT: No follow up scheduled     INITIAL ASSESSMENT:  Ms. Nasim Mulligan is a 34 y.o. female presenting to physical therapy with complaints of neck pain with radiating pain in right upper extremity. Patient reports pain in neck and arm for years and finally had a diagnosis of pseudotumor cerebri in . Patient then had a  shunt placement in 2017 to relieve pressure. Patient reports no significant relief since shunt placement and continues to report pain in cervical spine, right upper extremity, and daily migraines/headaches. Patient reports not having an MRI to cervical spine. Pain is located in lower cervical and upper thoracic spine and increases with left rotation, and cervical flexion and extension. Pain also radiates into right upper extremity down to the hand. Pain is 5/10 at best and 7/10 at worst.  Primary goal is to reduce pain with ADL's. Patient is a good candidate for skilled physical therapy services to include manual therapy, therapeutic exercise, and pain modalities as needed. PROBLEM LIST (Impacting functional limitations):  1. Decreased Strength  2. Decreased ADL/Functional Activities  3.  Increased Pain  4. Decreased Activity Tolerance  5. Increased Fatigue  6. Decreased Flexibility/Joint Mobility INTERVENTIONS PLANNED:  1. Cold  2. Cryotherapy  3. Electrical Stimulation  4. Heat  5. Home Exercise Program (HEP)  6. Manual Therapy  7. Neuromuscular Re-education/Strengthening  8. Range of Motion (ROM)  9. Therapeutic Activites  10. Therapeutic Exercise/Strengthening  11. Transcutaneous Electrical Nerve Stimulation (TENS)  12. Ultrasound (US)  13. Therapuetic dry needling   TREATMENT PLAN:  Effective Dates: 3/13/18 TO 4/10/18. Frequency/Duration: 2 times a week for 4 weeks  GOALS: (Goals have been discussed and agreed upon with patient.)  Short-Term Functional Goals: Time Frame: 3/13/18 to 3/27/18  1. Patient will be independent with home program to improve posture and head/neck position. 2. Patient will report no more than 7/10 pain in cervical spine with ADL's. Discharge Goals: Time Frame: 3/13/18 to 4/10/18  1. Patient will report no more than 4/10 pain in cervical spine with ADL's.  2. Patient will report no more than 3/10 pain in right upper extremity with ADL's.  3. Patient will increase left cervical rotation to 55 degrees to increase tolerance for driving and head turning. 4. Patient will report no more than 2/10 pain in cervical spine with all active cervical motions. 5. Patient will increase  strength in right hand to 35 lbs to increase functional use of upper extremity. 6. Patient will improve Neck disability index score to 20/50 from 38/50. Rehabilitation Potential For Stated Goals: Good  Regarding Merline Rawls's therapy, I certify that the treatment plan above will be carried out by a therapist or under their direction. Thank you for this referral,  Carey Cade PT     Referring Physician Signature: Ronna Abbott MD              Date                    The information in this section was collected on 3/13/18 (except where otherwise noted).   HISTORY:   History of Present Injury/Illness (Reason for Referral):  Ms. Yenni Amezquita is a 34 y.o. female presenting to physical therapy with complaints of neck pain with radiating pain in right upper extremity. Patient reports pain in neck and arm for years and finally had a diagnosis of pseudotumor cerebri in 2017. Patient then had a  shunt placement in August 2017 to relieve pressure. Patient reports no significant relief since shunt placement and continues to report pain in cervical spine, right upper extremity, and daily migraines/headaches. Patient reports not having an MRI to cervical spine. Pain is located in lower cervical and upper thoracic spine and increases with left rotation, and cervical flexion and extension. Pain also radiates into right upper extremity down to the hand. Pain is 5/10 at best and 7/10 at worst.  Primary goal is to reduce pain with ADL's. Past Medical History/Comorbidities:   Ms. Yenni Amezquita  has a past medical history of Allergic rhinitis; Anxiety; Depression; GERD (gastroesophageal reflux disease); History of peptic ulcer; Migraines; Ovarian torsion; and Personal history of urinary calculi. Ms. Yenni Amezquita  has a past surgical history that includes hx lap cholecystectomy; hx partial hysterectomy; hx colonoscopy (2009); hx hysterectomy (2012); hx cholecystectomy (2013); colonoscopy (N/A, 5/11/2017); and hx other surgical (09/2017). Social History/Living Environment:     Lives at home with family  Prior Level of Function/Work/Activity:  Not working. Mother of 2 children (ages 6 and 8)  Dominant Side:         RIGHT  Current Medications:       Current Outpatient Prescriptions:     clonazePAM (KLONOPIN) 2 mg tablet, Take 1 Tab by mouth daily as needed. , Disp: 30 Tab, Rfl: 0    diphenoxylate-atropine (LOMOTIL) 2.5-0.025 mg per tablet, Take 1-2 Tabs by mouth four (4) times daily as needed for Diarrhea.  Max Daily Amount: 8 Tabs., Disp: 30 Tab, Rfl: 0    oxyCODONE IR (OXY-IR) 15 mg immediate release tablet, Take 1 Tab by mouth three (3) times daily as needed for Pain. Max Daily Amount: 45 mg., Disp: 75 Tab, Rfl: 0    topiramate (TOPAMAX) 100 mg tablet, 1 in am and 1 at night to prevent headaches, Disp: 75 Tab, Rfl: 5    tiZANidine (ZANAFLEX) 4 mg tablet, Take 1 Tab by mouth nightly. Sleep/chronic headaches/neck pain, Disp: 30 Tab, Rfl: 2    DULoxetine (CYMBALTA) 30 mg capsule, Take 1 Cap by mouth daily. , Disp: 30 Cap, Rfl: 2    promethazine (PHENERGAN) 25 mg tablet, Take 1 Tab by mouth every six (6) hours as needed for Nausea., Disp: 60 Tab, Rfl: 1    PREMARIN 1.25 mg tablet, Take 1 Tab by mouth daily. , Disp: 30 Tab, Rfl: 11    naproxen sodium (ALEVE) 220 mg tablet, Take 2 Tabs by mouth two (2) times daily (with meals). To prevent and/or treat migraines, Disp: 120 Tab, Rfl: 0    nystatin (NYSTOP) powder, Apply  to affected area three (3) times daily. , Disp: 60 g, Rfl: 11    Current Facility-Administered Medications:     promethazine (PHENERGAN) injection 50 mg, 50 mg, IntraMUSCular, Q6H PRN, Linette Sommer MD, 50 mg at 17 1128   Date Last Reviewed:  3/26/2018   Number of Personal Factors/Comorbidities that affect the Plan of Care:  (chronic pain, pseudotumor cerebri) 1-2: MODERATE COMPLEXITY   EXAMINATION:   Observation/Orthostatic Postural Assessment:          Patient presents with mild forward head and rounded shoulders posture. Patient presents with well healed scar over right ear where  shunt was placed. Palpation:          Tender at bilateral upper trapezius, left cervical paraspinals, and left suboccipital muscles. ROM:            Upper extremity range of motion is normal.    Cervical AROM: (in degrees)  Rotation: left=40 with pain in left cervical spine, right=55  Side bendin bilaterally  Flexion:25 with pain in cervical spine and down right arm  Extension: 15 with pain down right arm    Strength:            Gross strength in bilateral shoulders is 4/5.   Elbow flexion: left=5/5, right=4/5     strength: left=45 lbs, right=10 lbs    Special Tests:            Cervical compresion=no change in symptoms  Cervical traction=no change in symptoms  Spurling's test=postive on left, negative on right    Neurological Screen:        Myotomes:  Bilateral upper extremities are normal        Dermatomes:  Intact for light touch and sensation    Functional Mobility:         Transfers:  Minimal difficulty        Bed Mobility:  Minimal difficulty     Body Structures Involved:  1. Bones  2. Joints  3. Muscles Body Functions Affected:  1. Sensory/Pain  2. Neuromusculoskeletal  3. Movement Related Activities and Participation Affected:  1. General Tasks and Demands  2. Mobility  3. Self Care  4. Domestic Life  5. Community, Social and [place]   Number of elements (examined above) that affect the Plan of Care: 3: MODERATE COMPLEXITY   CLINICAL PRESENTATION:   Presentation: Evolving clinical presentation with changing clinical characteristics: MODERATE COMPLEXITY   CLINICAL DECISION MAKING:   Outcome Measure: Tool Used: Neck Disability Index (NDI)  Score:  Initial: 38/50  Most Recent: X/50 (Date: -- )   Interpretation of Score: The Neck Disability Index is a revised form of the Oswestry Low Back Pain Index and is designed to measure the activities of daily living in person's with neck pain. Each section is scored on a 0-5 scale, 5 representing the greatest disability. The scores of each section are added together for a total score of 50. Score 0 1-10 11-20 21-30 31-40 41-49 50   Modifier CH CI CJ CK CL CM CN       Medical Necessity:   · Patient is expected to demonstrate progress in strength, range of motion, coordination and functional technique to decrease pain and increase tolerance for ADL's. · Skilled intervention continues to be required due to pain and limitation in cervical spine and right upper extremity.   Reason for Services/Other Comments:  · Patient continues to require skilled intervention due to pain and limitation in cervical spine and right upper extremity. Use of outcome tool(s) and clinical judgement create a POC that gives a:  (chronic pain, mild co-morbidities, marked pain, significant limitation on outcome measure) Questionable prediction of patient's progress: MODERATE COMPLEXITY            TREATMENT:   (In addition to Assessment/Re-Assessment sessions the following treatments were rendered)  Pre-treatment Symptoms/Complaints:  Patient reports marked pain in cervical spine today and marked pain over the past couple of days. Patient is recovering from sickness. Pain: Initial:   Pain Intensity 1: 8  Pain Location 1: Spine, cervical  Pain Orientation 1: Mid  Post Session:  Patient reports 6/10 pain     Therapeutic Exercise: (20 Minutes):  Exercises per grid below to improve mobility, strength, coordination and dynamic movement of neck - generalized to improve functional bending, carrying and head/neck movements. Required minimal visual, verbal and manual cues to promote proper body alignment, promote proper body posture and promote proper body mechanics. Progressed resistance, range and repetitions as indicated. Date:  3/13/18 Date:  3/14/18 Date:  3/26/18   Activity/Exercise Parameters Parameters Parameters   Chin tucks [id number], supine [id number], supine [id number]   Rhomboid stretch  [id number]   Upper trap stretch  [id number], right [id number]   Doorway stretch, low  [id number]   Low rows  2x10, green    Levator stretch   [id number]             Manual Therapy (    Soft Tissue Mobilization Duration  Duration: 25 Minutes): Soft tissue mobilization to bilateral upper trapezius and thoracic paraspinals in prone and left suboccipitals and cervical paraspinals in supine to decrease pain and irritation. Therapeutic dry needling to paravertebrals C5-C7 in prone to decrease pain.     Therapeutic Modalities: for pain and edema               Cervical Spine Heat  Type: Moist pack  Duration: 10 minutes  Patient Position: Supine                                                                               HEP: As above; handouts given to patient for all exercises. Treatment/Session Assessment:    · Response to Treatment:  Patient reported no increase in pain or distal symptoms with stretches today. Patient reported good relief with dry needling. · Recommendations/Intent for next treatment session: \"Next visit will focus on advancements to more challenging activities\".     Total Treatment Duration: 55 minutes    PT Patient Time In/Time Out  Time In: 1630  Time Out: [address], PT

## 2018-03-27 ENCOUNTER — HOSPITAL ENCOUNTER (OUTPATIENT)
Dept: PHYSICAL THERAPY | Age: 33
Discharge: HOME | End: 2018-03-27
Payer: COMMERCIAL

## 2018-03-27 PROCEDURE — 97140 MANUAL THERAPY 1/> REGIONS: CPT

## 2018-03-27 PROCEDURE — 97110 THERAPEUTIC EXERCISES: CPT

## 2018-03-27 NOTE — PROGRESS NOTES
Luz Marr  : 1985  Primary: [facility]  Secondary:  [address]  at [place] [address], [place], [address]  [phone]   [phone]       OUTPATIENT PHYSICAL THERAPY:Daily Note 3/27/2018    ICD-10: Treatment Diagnosis: Neck pain (M54.2)  Treatment diagnosis 2: Pain in right arm (M79.601)  Treatment diagnosis 3: Cervical radiculopathy (M54.12)  Precautions:  shunt placement  Allergies: Review of patient's allergies indicates no known allergies. Fall Risk Score: 1 (? 5 = High Risk)  MD Orders: evaluate and treat  MEDICAL/REFERRING DIAGNOSIS:  Cervicalgia [M54.2]  Pain in right arm [M79.601]   DATE OF ONSET: Patient reports neck pain for years  REFERRING PHYSICIAN: Justice Zaldivar MD  RETURN PHYSICIAN APPOINTMENT: No follow up scheduled     INITIAL ASSESSMENT:  Ms. Bart Castro is a 34 y.o. female presenting to physical therapy with complaints of neck pain with radiating pain in right upper extremity. Patient reports pain in neck and arm for years and finally had a diagnosis of pseudotumor cerebri in . Patient then had a  shunt placement in 2017 to relieve pressure. Patient reports no significant relief since shunt placement and continues to report pain in cervical spine, right upper extremity, and daily migraines/headaches. Patient reports not having an MRI to cervical spine. Pain is located in lower cervical and upper thoracic spine and increases with left rotation, and cervical flexion and extension. Pain also radiates into right upper extremity down to the hand. Pain is 5/10 at best and 7/10 at worst.  Primary goal is to reduce pain with ADL's. Patient is a good candidate for skilled physical therapy services to include manual therapy, therapeutic exercise, and pain modalities as needed. PROBLEM LIST (Impacting functional limitations):  1. Decreased Strength  2. Decreased ADL/Functional Activities  3.  Increased Pain  4. Decreased Activity Tolerance  5. Increased Fatigue  6. Decreased Flexibility/Joint Mobility INTERVENTIONS PLANNED:  1. Cold  2. Cryotherapy  3. Electrical Stimulation  4. Heat  5. Home Exercise Program (HEP)  6. Manual Therapy  7. Neuromuscular Re-education/Strengthening  8. Range of Motion (ROM)  9. Therapeutic Activites  10. Therapeutic Exercise/Strengthening  11. Transcutaneous Electrical Nerve Stimulation (TENS)  12. Ultrasound (US)  13. Therapuetic dry needling   TREATMENT PLAN:  Effective Dates: 3/13/18 TO 4/10/18. Frequency/Duration: 2 times a week for 4 weeks  GOALS: (Goals have been discussed and agreed upon with patient.)  Short-Term Functional Goals: Time Frame: 3/13/18 to 3/27/18  1. Patient will be independent with home program to improve posture and head/neck position. 2. Patient will report no more than 7/10 pain in cervical spine with ADL's. Discharge Goals: Time Frame: 3/13/18 to 4/10/18  1. Patient will report no more than 4/10 pain in cervical spine with ADL's.  2. Patient will report no more than 3/10 pain in right upper extremity with ADL's.  3. Patient will increase left cervical rotation to 55 degrees to increase tolerance for driving and head turning. 4. Patient will report no more than 2/10 pain in cervical spine with all active cervical motions. 5. Patient will increase  strength in right hand to 35 lbs to increase functional use of upper extremity. 6. Patient will improve Neck disability index score to 20/50 from 38/50. Rehabilitation Potential For Stated Goals: Good  Regarding Merline Rawls's therapy, I certify that the treatment plan above will be carried out by a therapist or under their direction. Thank you for this referral,  Wing Miri PT     Referring Physician Signature: Justice Zaldivar MD              Date                    The information in this section was collected on 3/13/18 (except where otherwise noted).   HISTORY:   History of Present Injury/Illness (Reason for Referral):  Ms. Neal Li is a 34 y.o. female presenting to physical therapy with complaints of neck pain with radiating pain in right upper extremity. Patient reports pain in neck and arm for years and finally had a diagnosis of pseudotumor cerebri in 2017. Patient then had a  shunt placement in August 2017 to relieve pressure. Patient reports no significant relief since shunt placement and continues to report pain in cervical spine, right upper extremity, and daily migraines/headaches. Patient reports not having an MRI to cervical spine. Pain is located in lower cervical and upper thoracic spine and increases with left rotation, and cervical flexion and extension. Pain also radiates into right upper extremity down to the hand. Pain is 5/10 at best and 7/10 at worst.  Primary goal is to reduce pain with ADL's. Past Medical History/Comorbidities:   Ms. Neal Li  has a past medical history of Allergic rhinitis; Anxiety; Depression; GERD (gastroesophageal reflux disease); History of peptic ulcer; Migraines; Ovarian torsion; and Personal history of urinary calculi. Ms. Neal Li  has a past surgical history that includes hx lap cholecystectomy; hx partial hysterectomy; hx colonoscopy (2009); hx hysterectomy (2012); hx cholecystectomy (2013); colonoscopy (N/A, 5/11/2017); and hx other surgical (09/2017). Social History/Living Environment:     Lives at home with family  Prior Level of Function/Work/Activity:  Not working. Mother of 2 children (ages 6 and 8)  Dominant Side:         RIGHT  Current Medications:       Current Outpatient Prescriptions:     clonazePAM (KLONOPIN) 2 mg tablet, Take 1 Tab by mouth daily as needed. , Disp: 30 Tab, Rfl: 0    diphenoxylate-atropine (LOMOTIL) 2.5-0.025 mg per tablet, Take 1-2 Tabs by mouth four (4) times daily as needed for Diarrhea.  Max Daily Amount: 8 Tabs., Disp: 30 Tab, Rfl: 0    oxyCODONE IR (OXY-IR) 15 mg immediate release tablet, Take 1 Tab by mouth three (3) times daily as needed for Pain. Max Daily Amount: 45 mg., Disp: 75 Tab, Rfl: 0    topiramate (TOPAMAX) 100 mg tablet, 1 in am and 1 at night to prevent headaches, Disp: 75 Tab, Rfl: 5    tiZANidine (ZANAFLEX) 4 mg tablet, Take 1 Tab by mouth nightly. Sleep/chronic headaches/neck pain, Disp: 30 Tab, Rfl: 2    DULoxetine (CYMBALTA) 30 mg capsule, Take 1 Cap by mouth daily. , Disp: 30 Cap, Rfl: 2    promethazine (PHENERGAN) 25 mg tablet, Take 1 Tab by mouth every six (6) hours as needed for Nausea., Disp: 60 Tab, Rfl: 1    PREMARIN 1.25 mg tablet, Take 1 Tab by mouth daily. , Disp: 30 Tab, Rfl: 11    naproxen sodium (ALEVE) 220 mg tablet, Take 2 Tabs by mouth two (2) times daily (with meals). To prevent and/or treat migraines, Disp: 120 Tab, Rfl: 0    nystatin (NYSTOP) powder, Apply  to affected area three (3) times daily. , Disp: 60 g, Rfl: 11    Current Facility-Administered Medications:     promethazine (PHENERGAN) injection 50 mg, 50 mg, IntraMUSCular, Q6H PRN, Prince Del Rosario MD, 50 mg at 17 1128   Date Last Reviewed:  3/27/2018   Number of Personal Factors/Comorbidities that affect the Plan of Care:  (chronic pain, pseudotumor cerebri) 1-2: MODERATE COMPLEXITY   EXAMINATION:   Observation/Orthostatic Postural Assessment:          Patient presents with mild forward head and rounded shoulders posture. Patient presents with well healed scar over right ear where  shunt was placed. Palpation:          Tender at bilateral upper trapezius, left cervical paraspinals, and left suboccipital muscles. ROM:            Upper extremity range of motion is normal.    Cervical AROM: (in degrees)  Rotation: left=40 with pain in left cervical spine, right=55  Side bendin bilaterally  Flexion:25 with pain in cervical spine and down right arm  Extension: 15 with pain down right arm    Strength:            Gross strength in bilateral shoulders is 4/5.   Elbow flexion: left=5/5, right=4/5     strength: left=45 lbs, right=10 lbs    Special Tests:            Cervical compresion=no change in symptoms  Cervical traction=no change in symptoms  Spurling's test=postive on left, negative on right    Neurological Screen:        Myotomes:  Bilateral upper extremities are normal        Dermatomes:  Intact for light touch and sensation    Functional Mobility:         Transfers:  Minimal difficulty        Bed Mobility:  Minimal difficulty     Body Structures Involved:  1. Bones  2. Joints  3. Muscles Body Functions Affected:  1. Sensory/Pain  2. Neuromusculoskeletal  3. Movement Related Activities and Participation Affected:  1. General Tasks and Demands  2. Mobility  3. Self Care  4. Domestic Life  5. Community, Social and [place]   Number of elements (examined above) that affect the Plan of Care: 3: MODERATE COMPLEXITY   CLINICAL PRESENTATION:   Presentation: Evolving clinical presentation with changing clinical characteristics: MODERATE COMPLEXITY   CLINICAL DECISION MAKING:   Outcome Measure: Tool Used: Neck Disability Index (NDI)  Score:  Initial: 38/50  Most Recent: X/50 (Date: -- )   Interpretation of Score: The Neck Disability Index is a revised form of the Oswestry Low Back Pain Index and is designed to measure the activities of daily living in person's with neck pain. Each section is scored on a 0-5 scale, 5 representing the greatest disability. The scores of each section are added together for a total score of 50. Score 0 1-10 11-20 21-30 31-40 41-49 50   Modifier CH CI CJ CK CL CM CN       Medical Necessity:   · Patient is expected to demonstrate progress in strength, range of motion, coordination and functional technique to decrease pain and increase tolerance for ADL's. · Skilled intervention continues to be required due to pain and limitation in cervical spine and right upper extremity.   Reason for Services/Other Comments:  · Patient continues to require skilled intervention due to pain and limitation in cervical spine and right upper extremity. Use of outcome tool(s) and clinical judgement create a POC that gives a:  (chronic pain, mild co-morbidities, marked pain, significant limitation on outcome measure) Questionable prediction of patient's progress: MODERATE COMPLEXITY            TREATMENT:   (In addition to Assessment/Re-Assessment sessions the following treatments were rendered)  Pre-treatment Symptoms/Complaints:  Patient reports moderate pain in cervical spine this morning and reports mild relief following dry needling yesterday. Pain: Initial:   Pain Intensity 1: 5  Pain Location 1: Spine, cervical  Pain Orientation 1: Mid  Post Session:  Patient reports 4/10 pain     Therapeutic Exercise: (10 Minutes):  Exercises per grid below to improve mobility, strength, coordination and dynamic movement of neck - generalized to improve functional bending, carrying and head/neck movements. Required minimal visual, verbal and manual cues to promote proper body alignment, promote proper body posture and promote proper body mechanics. Progressed resistance, range and repetitions as indicated. Date:  3/27/18 Date:  3/14/18 Date:  3/26/18   Activity/Exercise Parameters Parameters Parameters   Chin tucks 40r2lso, supine 32r7lgk, supine 41b0hbv   Rhomboid stretch [id number]   Upper trap stretch [id number], right [id number]   Doorway stretch, low  [id number]   Low rows  2x10, green    Levator stretch   [id number]             Manual Therapy (    Soft Tissue Mobilization Duration  Duration: 35 Minutes): Soft tissue mobilization to bilateral upper trapezius and thoracic paraspinals in prone and left suboccipitals and cervical paraspinals in supine to decrease pain and irritation. Therapeutic dry needling to paravertebrals C3-C7 and bilateral spinal accessory in prone to decrease pain.     Therapeutic Modalities: for pain and edema               Cervical Spine Heat  Type: Moist pack  Duration: 10 minutes  Patient Position: Supine                                                                               HEP: As above; handouts given to patient for all exercises. Treatment/Session Assessment:    · Response to Treatment:  Patient continues to report good relief with manual therapy and dry needling. Patient continues to tolerate stretches with no complaints. Patient to be absent for 1 week while on vacation. · Recommendations/Intent for next treatment session: \"Next visit will focus on advancements to more challenging activities\".     Total Treatment Duration: 55 minutes    PT Patient Time In/Time Out  Time In: 0800  Time Out: [address] By Magy Guerrero PT

## 2018-03-28 ENCOUNTER — APPOINTMENT (OUTPATIENT)
Dept: PHYSICAL THERAPY | Age: 33
End: 2018-03-28
Payer: COMMERCIAL

## 2018-04-09 ENCOUNTER — HOSPITAL ENCOUNTER (OUTPATIENT)
Dept: PHYSICAL THERAPY | Age: 33
Discharge: HOME | End: 2018-04-09
Payer: COMMERCIAL

## 2018-04-10 ENCOUNTER — HOSPITAL ENCOUNTER (OUTPATIENT)
Dept: PHYSICAL THERAPY | Age: 33
Discharge: HOME | End: 2018-04-10
Payer: COMMERCIAL

## 2018-04-10 PROCEDURE — 97140 MANUAL THERAPY 1/> REGIONS: CPT

## 2018-04-10 PROCEDURE — 97110 THERAPEUTIC EXERCISES: CPT

## 2018-04-11 ENCOUNTER — HOSPITAL ENCOUNTER (OUTPATIENT)
Dept: PHYSICAL THERAPY | Age: 33
Discharge: HOME | End: 2018-04-11
Payer: COMMERCIAL

## 2018-05-02 ENCOUNTER — HOSPITAL ENCOUNTER (OUTPATIENT)
Dept: SLEEP MEDICINE | Age: 33
Discharge: HOME | End: 2018-05-02
Payer: COMMERCIAL

## 2018-05-02 PROCEDURE — 95810 POLYSOM 6/> YRS 4/> PARAM: CPT

## 2018-05-16 NOTE — THERAPY DISCHARGE
June Granados  : 1985  Primary: [facility]  Secondary:  [address] at [address], [place], [address] [place] [address]  [phone]   [phone]       OUTPATIENT PHYSICAL THERAPY:Discontinuation Summary 2018    ICD-10: Treatment Diagnosis: Neck pain (M54.2)  Treatment diagnosis 2: Pain in right arm (M79.601)  Treatment diagnosis 3: Cervical radiculopathy (M54.12)  Precautions:  shunt placement  Allergies: Review of patient's allergies indicates no known allergies. Fall Risk Score: 1 (? 5 = High Risk)  MD Orders: evaluate and treat  MEDICAL/REFERRING DIAGNOSIS:  Cervicalgia [M54.2]  Pain in right arm [M79.601]   DATE OF ONSET: Patient reports neck pain for years  REFERRING PHYSICIAN: Ruperto Alcocer MD  RETURN PHYSICIAN APPOINTMENT: No follow up scheduled     INITIAL ASSESSMENT:  Ms. Clive Bates is a 34 y.o. female presenting to physical therapy with complaints of neck pain with radiating pain in right upper extremity. Patient reports pain in neck and arm for years and finally had a diagnosis of pseudotumor cerebri in . Patient then had a  shunt placement in 2017 to relieve pressure. Patient reports no significant relief since shunt placement and continues to report pain in cervical spine, right upper extremity, and daily migraines/headaches. Patient reports not having an MRI to cervical spine. Pain is located in lower cervical and upper thoracic spine and increases with left rotation, and cervical flexion and extension. Pain also radiates into right upper extremity down to the hand. Pain is 5/10 at best and 7/10 at worst.  Primary goal is to reduce pain with ADL's. June Granados has been seen in physical therapy from 3/13/18 to 4/10/18 for 5 visits. Treatment has been discontinued at this time due to patient failing to return for additional treatment.   Patient was scheduled for therapy but did not show for multiple appointments from 4/11/18 to 4/17/18 and did not contact clinic for follow up. The below goals were met prior to discontinuation. Most goals were not met due to early termination of therapy. Thank you for this referral.        PROBLEM LIST (Impacting functional limitations):  1. Decreased Strength  2. Decreased ADL/Functional Activities  3. Increased Pain  4. Decreased Activity Tolerance  5. Increased Fatigue  6. Decreased Flexibility/Joint Mobility INTERVENTIONS PLANNED:  1. Cold  2. Cryotherapy  3. Electrical Stimulation  4. Heat  5. Home Exercise Program (HEP)  6. Manual Therapy  7. Neuromuscular Re-education/Strengthening  8. Range of Motion (ROM)  9. Therapeutic Activites  10. Therapeutic Exercise/Strengthening  11. Transcutaneous Electrical Nerve Stimulation (TENS)  12. Ultrasound (US)  13. Therapuetic dry needling   TREATMENT PLAN:  Effective Dates: 3/13/18 TO 4/10/18. Frequency/Duration: Patient to be discharged. GOALS: (Goals have been discussed and agreed upon with patient.)  Short-Term Functional Goals: Time Frame: 3/13/18 to 3/27/18  1. Patient will be independent with home program to improve posture and head/neck position. -met  2. Patient will report no more than 7/10 pain in cervical spine with ADL's.-not met  Discharge Goals: Time Frame: 3/13/18 to 4/10/18  1. Patient will report no more than 4/10 pain in cervical spine with ADL's.-not met  2. Patient will report no more than 3/10 pain in right upper extremity with ADL's.-not met  3. Patient will increase left cervical rotation to 55 degrees to increase tolerance for driving and head turning.-not met  4. Patient will report no more than 2/10 pain in cervical spine with all active cervical motions. -not met  5. Patient will increase  strength in right hand to 35 lbs to increase functional use of upper extremity. -not met  6. Patient will improve Neck disability index score to 20/50 from 38/50. -not met  Rehabilitation Potential For Stated Goals: Good  Regarding Merline Rawls's therapy, I certify that the treatment plan above will be carried out by a therapist or under their direction.   Thank you for this referral,  Emilia Donald, PT

## 2022-03-18 PROBLEM — G93.2 PSEUDOTUMOR CEREBRI: Status: ACTIVE | Noted: 2017-01-22

## 2022-03-18 PROBLEM — F41.9 ANXIETY: Status: ACTIVE | Noted: 2017-01-22

## 2022-03-18 PROBLEM — R51.9 CHRONIC INTRACTABLE HEADACHE: Status: ACTIVE | Noted: 2017-01-22

## 2022-03-18 PROBLEM — G89.29 CHRONIC INTRACTABLE HEADACHE: Status: ACTIVE | Noted: 2017-01-22

## 2022-03-19 PROBLEM — F33.41 RECURRENT MAJOR DEPRESSIVE DISORDER, IN PARTIAL REMISSION: Status: ACTIVE | Noted: 2017-10-13

## 2022-03-20 PROBLEM — E89.41 SURGICAL MENOPAUSE, SYMPTOMATIC: Status: ACTIVE | Noted: 2017-01-22

## 2023-09-19 NOTE — PROGRESS NOTES
Naila Soni  : 1985  Primary: [facility]  Secondary:  [address]  at [address], [place], [address] [place] [address]  [phone]   [phone]       OUTPATIENT PHYSICAL THERAPY:Daily Note 4/10/2018    ICD-10: Treatment Diagnosis: Neck pain (M54.2)  Treatment diagnosis 2: Pain in right arm (M79.601)  Treatment diagnosis 3: Cervical radiculopathy (M54.12)  Precautions:  shunt placement  Allergies: Review of patient's allergies indicates no known allergies. Fall Risk Score: 1 (? 5 = High Risk)  MD Orders: evaluate and treat  MEDICAL/REFERRING DIAGNOSIS:  Cervicalgia [M54.2]  Pain in right arm [M79.601]   DATE OF ONSET: Patient reports neck pain for years  REFERRING PHYSICIAN: Myriam Martinez MD  RETURN PHYSICIAN APPOINTMENT: No follow up scheduled     INITIAL ASSESSMENT:  Ms. Lukas Ramos is a 34 y.o. female presenting to physical therapy with complaints of neck pain with radiating pain in right upper extremity. Patient reports pain in neck and arm for years and finally had a diagnosis of pseudotumor cerebri in 2017. Patient then had a  shunt placement in 2017 to relieve pressure. Patient reports no significant relief since shunt placement and continues to report pain in cervical spine, right upper extremity, and daily migraines/headaches. Patient reports not having an MRI to cervical spine. Pain is located in lower cervical and upper thoracic spine and increases with left rotation, and cervical flexion and extension. Pain also radiates into right upper extremity down to the hand. Pain is 5/10 at best and 7/10 at worst.  Primary goal is to reduce pain with ADL's. Patient is a good candidate for skilled physical therapy services to include manual therapy, therapeutic exercise, and pain modalities as needed. PROBLEM LIST (Impacting functional limitations):  1. Decreased Strength  2. Decreased ADL/Functional Activities  3.  Increased Pain  4. Decreased Activity Tolerance  5. Increased Fatigue  6. Decreased Flexibility/Joint Mobility INTERVENTIONS PLANNED:  1. Cold  2. Cryotherapy  3. Electrical Stimulation  4. Heat  5. Home Exercise Program (HEP)  6. Manual Therapy  7. Neuromuscular Re-education/Strengthening  8. Range of Motion (ROM)  9. Therapeutic Activites  10. Therapeutic Exercise/Strengthening  11. Transcutaneous Electrical Nerve Stimulation (TENS)  12. Ultrasound (US)  13. Therapuetic dry needling   TREATMENT PLAN:  Effective Dates: 3/13/18 TO 4/10/18. Frequency/Duration: 2 times a week for 4 weeks  GOALS: (Goals have been discussed and agreed upon with patient.)  Short-Term Functional Goals: Time Frame: 3/13/18 to 3/27/18  1. Patient will be independent with home program to improve posture and head/neck position. 2. Patient will report no more than 7/10 pain in cervical spine with ADL's. Discharge Goals: Time Frame: 3/13/18 to 4/10/18  1. Patient will report no more than 4/10 pain in cervical spine with ADL's.  2. Patient will report no more than 3/10 pain in right upper extremity with ADL's.  3. Patient will increase left cervical rotation to 55 degrees to increase tolerance for driving and head turning. 4. Patient will report no more than 2/10 pain in cervical spine with all active cervical motions. 5. Patient will increase  strength in right hand to 35 lbs to increase functional use of upper extremity. 6. Patient will improve Neck disability index score to 20/50 from 38/50. Rehabilitation Potential For Stated Goals: Good  Regarding Merline Rawls's therapy, I certify that the treatment plan above will be carried out by a therapist or under their direction. Thank you for this referral,  Lynsey Nolan PT     Referring Physician Signature: Miguel Gilmore MD              Date                    The information in this section was collected on 3/13/18 (except where otherwise noted).   HISTORY:   History of Present Injury/Illness (Reason for Referral):  Ms. Tao Harley is a 34 y.o. female presenting to physical therapy with complaints of neck pain with radiating pain in right upper extremity. Patient reports pain in neck and arm for years and finally had a diagnosis of pseudotumor cerebri in 2017. Patient then had a  shunt placement in August 2017 to relieve pressure. Patient reports no significant relief since shunt placement and continues to report pain in cervical spine, right upper extremity, and daily migraines/headaches. Patient reports not having an MRI to cervical spine. Pain is located in lower cervical and upper thoracic spine and increases with left rotation, and cervical flexion and extension. Pain also radiates into right upper extremity down to the hand. Pain is 5/10 at best and 7/10 at worst.  Primary goal is to reduce pain with ADL's. Past Medical History/Comorbidities:   Ms. Tao Harley  has a past medical history of Allergic rhinitis; Anxiety; Depression; GERD (gastroesophageal reflux disease); History of peptic ulcer; Migraines; Ovarian torsion; and Personal history of urinary calculi. Ms. Tao Harley  has a past surgical history that includes hx lap cholecystectomy; hx partial hysterectomy; hx colonoscopy (2009); hx hysterectomy (2012); hx cholecystectomy (2013); colonoscopy (N/A, 5/11/2017); and hx other surgical (09/2017). Social History/Living Environment:     Lives at home with family  Prior Level of Function/Work/Activity:  Not working. Mother of 2 children (ages 6 and 8)  Dominant Side:         RIGHT  Current Medications:       Current Outpatient Prescriptions:     clonazePAM (KLONOPIN) 2 mg tablet, Take 1 Tab by mouth daily as needed. , Disp: 30 Tab, Rfl: 0    diphenoxylate-atropine (LOMOTIL) 2.5-0.025 mg per tablet, Take 1-2 Tabs by mouth four (4) times daily as needed for Diarrhea.  Max Daily Amount: 8 Tabs., Disp: 30 Tab, Rfl: 0    oxyCODONE IR (OXY-IR) 15 mg immediate release tablet, Take 1 Tab by mouth three (3) times daily as needed for Pain. Max Daily Amount: 45 mg., Disp: 75 Tab, Rfl: 0    topiramate (TOPAMAX) 100 mg tablet, 1 in am and 1 at night to prevent headaches, Disp: 75 Tab, Rfl: 5    tiZANidine (ZANAFLEX) 4 mg tablet, Take 1 Tab by mouth nightly. Sleep/chronic headaches/neck pain, Disp: 30 Tab, Rfl: 2    DULoxetine (CYMBALTA) 30 mg capsule, Take 1 Cap by mouth daily. , Disp: 30 Cap, Rfl: 2    promethazine (PHENERGAN) 25 mg tablet, Take 1 Tab by mouth every six (6) hours as needed for Nausea., Disp: 60 Tab, Rfl: 1    PREMARIN 1.25 mg tablet, Take 1 Tab by mouth daily. , Disp: 30 Tab, Rfl: 11    naproxen sodium (ALEVE) 220 mg tablet, Take 2 Tabs by mouth two (2) times daily (with meals). To prevent and/or treat migraines, Disp: 120 Tab, Rfl: 0    nystatin (NYSTOP) powder, Apply  to affected area three (3) times daily. , Disp: 60 g, Rfl: 11    Current Facility-Administered Medications:     promethazine (PHENERGAN) injection 50 mg, 50 mg, IntraMUSCular, Q6H PRN, Manju Arias MD, 50 mg at 17 1128   Date Last Reviewed:  4/10/2018   Number of Personal Factors/Comorbidities that affect the Plan of Care:  (chronic pain, pseudotumor cerebri) 1-2: MODERATE COMPLEXITY   EXAMINATION:   Observation/Orthostatic Postural Assessment:          Patient presents with mild forward head and rounded shoulders posture. Patient presents with well healed scar over right ear where  shunt was placed. Palpation:          Tender at bilateral upper trapezius, left cervical paraspinals, and left suboccipital muscles. ROM:            Upper extremity range of motion is normal.    Cervical AROM: (in degrees)  Rotation: left=40 with pain in left cervical spine, right=55  Side bendin bilaterally  Flexion:25 with pain in cervical spine and down right arm  Extension: 15 with pain down right arm    Strength:            Gross strength in bilateral shoulders is 4/5.   Elbow flexion: left=5/5, right=4/5     strength: left=45 lbs, right=10 lbs    Special Tests:            Cervical compresion=no change in symptoms  Cervical traction=no change in symptoms  Spurling's test=postive on left, negative on right    Neurological Screen:        Myotomes:  Bilateral upper extremities are normal        Dermatomes:  Intact for light touch and sensation    Functional Mobility:         Transfers:  Minimal difficulty        Bed Mobility:  Minimal difficulty     Body Structures Involved:  1. Bones  2. Joints  3. Muscles Body Functions Affected:  1. Sensory/Pain  2. Neuromusculoskeletal  3. Movement Related Activities and Participation Affected:  1. General Tasks and Demands  2. Mobility  3. Self Care  4. Domestic Life  5. Community, Social and [place]   Number of elements (examined above) that affect the Plan of Care: 3: MODERATE COMPLEXITY   CLINICAL PRESENTATION:   Presentation: Evolving clinical presentation with changing clinical characteristics: MODERATE COMPLEXITY   CLINICAL DECISION MAKING:   Outcome Measure: Tool Used: Neck Disability Index (NDI)  Score:  Initial: 38/50  Most Recent: X/50 (Date: -- )   Interpretation of Score: The Neck Disability Index is a revised form of the Oswestry Low Back Pain Index and is designed to measure the activities of daily living in person's with neck pain. Each section is scored on a 0-5 scale, 5 representing the greatest disability. The scores of each section are added together for a total score of 50. Score 0 1-10 11-20 21-30 31-40 41-49 50   Modifier CH CI CJ CK CL CM CN       Medical Necessity:   · Patient is expected to demonstrate progress in strength, range of motion, coordination and functional technique to decrease pain and increase tolerance for ADL's. · Skilled intervention continues to be required due to pain and limitation in cervical spine and right upper extremity.   Reason for Services/Other Comments:  · Patient continues to require skilled intervention due to pain and limitation in cervical spine and right upper extremity. Use of outcome tool(s) and clinical judgement create a POC that gives a:  (chronic pain, mild co-morbidities, marked pain, significant limitation on outcome measure) Questionable prediction of patient's progress: MODERATE COMPLEXITY            TREATMENT:   (In addition to Assessment/Re-Assessment sessions the following treatments were rendered)  Pre-treatment Symptoms/Complaints:  Patient reports increased pain and irritation due to a migraine over the past few days. Patient is going to follow up with MD later today regarding migraine. Pain: Initial:   Pain Intensity 1: 5  Pain Location 1: Spine, cervical  Pain Orientation 1: Mid, Left  Post Session:  Patient reports 4/10 pain     Therapeutic Exercise: (15 Minutes):  Exercises per grid below to improve mobility, strength, coordination and dynamic movement of neck - generalized to improve functional bending, carrying and head/neck movements. Required minimal visual, verbal and manual cues to promote proper body alignment, promote proper body posture and promote proper body mechanics. Progressed resistance, range and repetitions as indicated. Date:  3/27/18 Date:  4/10/18 Date:  3/26/18   Activity/Exercise Parameters Parameters Parameters   Chin tucks 12c9ivn, supine  83w3dig   Rhomboid stretch [id number] 4z96igt   Upper trap stretch 0d14xwe 3e65mll, right 0q76hus   Doorway stretch, low  4m55cnz 0k68wnl   Low rows      Levator stretch  9f03umh 9l89pjs             Manual Therapy (    Soft Tissue Mobilization Duration  Duration: 40 Minutes): Soft tissue mobilization to bilateral upper trapezius and thoracic paraspinals in prone and left suboccipitals and cervical paraspinals in supine to decrease pain and irritation.       Therapeutic Modalities: for pain and edema                                                                                               HEP: As above; handouts given to patient for all exercises. Treatment/Session Assessment:    · Response to Treatment:  Patient continues to report mild relief with manual therapy to thoracic and cervical spine. Patient reported continued migraine pain following session. · Recommendations/Intent for next treatment session: \"Next visit will focus on advancements to more challenging activities\".     Total Treatment Duration: 55 minutes    PT Patient Time In/Time Out  Time In: 0800  Time Out: Pr-2 Rodgers By Magy Guerrero PT no

## 2025-04-10 ENCOUNTER — OFFICE VISIT (OUTPATIENT)
Dept: UROGYNECOLOGY | Age: 40
End: 2025-04-10
Payer: COMMERCIAL

## 2025-04-10 VITALS — HEIGHT: 67 IN | WEIGHT: 188 LBS | BODY MASS INDEX: 29.51 KG/M2

## 2025-04-10 DIAGNOSIS — N39.3 SUI (STRESS URINARY INCONTINENCE, FEMALE): Primary | ICD-10-CM

## 2025-04-10 DIAGNOSIS — N81.89 WEAKNESS OF PELVIC FLOOR: ICD-10-CM

## 2025-04-10 LAB
BILIRUBIN, URINE, POC: NEGATIVE
BLOOD URINE, POC: NEGATIVE
GLUCOSE URINE, POC: NEGATIVE
KETONES, URINE, POC: NEGATIVE
LEUKOCYTE ESTERASE, URINE, POC: NEGATIVE
NITRITE, URINE, POC: NEGATIVE
PH, URINE, POC: 7 (ref 4.6–8)
PROTEIN,URINE, POC: NEGATIVE
SPECIFIC GRAVITY, URINE, POC: 1.01 (ref 1–1.03)
URINALYSIS CLARITY, POC: CLEAR
URINALYSIS COLOR, POC: YELLOW
UROBILINOGEN, POC: NORMAL MG/DL

## 2025-04-10 PROCEDURE — 51701 INSERT BLADDER CATHETER: CPT | Performed by: OBSTETRICS & GYNECOLOGY

## 2025-04-10 PROCEDURE — 99204 OFFICE O/P NEW MOD 45 MIN: CPT | Performed by: OBSTETRICS & GYNECOLOGY

## 2025-04-10 PROCEDURE — 81002 URINALYSIS NONAUTO W/O SCOPE: CPT | Performed by: OBSTETRICS & GYNECOLOGY

## 2025-04-10 NOTE — ASSESSMENT & PLAN NOTE
We discussed the differential diagnosis of urinary incontinence. She is aware that women leak urine for multiple different reasons. Many women have more than one type of urinary incontinence. We also discussed the pathogenesis and etiology of stress urinary incontinence. I explained the epidemiology of incontinence. I used visual aids to explain stress incontinence and the treatment. I offered her options which include nothing, dietary modifications, physical therapy, barrier treatment, in-office procedures and surgery.     My recommendations:  Please eliminate bladder irritants. This includes caffeine, artificial sweeteners, carbonated beverages, alcohol, tomato based products, citrus and spicy foods.   I highly recommend pelvic floor physical therapy.   We also discussed procedures that are performed for HAKAN including miduethral slings and bulking.      I recommend PT prior to consideration of more invasive procedures.

## 2025-04-10 NOTE — PROGRESS NOTES
7/10/2025) for HAKAN.     This is exclusive of separately reported procedures.     Sherrill Paul DO

## 2025-04-10 NOTE — PATIENT INSTRUCTIONS
Pelvic Floor Therapy List:    Locations within Critical access hospital    Scheduling phone number: [phone]    [facility]       [address]  [facility] [address]    [facility]  [address]  [facility] [address]    [facility]  [address]  [place] [address]    [facility]  [address] [address]      Locations Outside of Critical access hospital    [facility]- (Elsa Sun & Deborah Berger)  [address]  [facility] [address]  Phone: [phone]  Fax [phone]    [facility] -(Ira Means)  [address] [address]  Phone: [phone]  Fax: [phone]    [facility] (Geetha Corrales)  [address]  [place] [address]  Phone: [phone]  Fax: [phone]    [facility] (Ashley Osullivan)  [address] [address]  Phone: [phone]  Fax: [phone]    [facility] (Dr. Janet Mariano and Dr. Geetha Ramirez)  [address],   [place] [address]   Phone: [phone]  Fax: [phone]  &  [address] [facility] [address] [address]    [facility] (Salome Hale)  [address] [place] [address]  Phone: [phone]  Fax: [phone]    [facility]- (Marielle Johnson)  [address]  [facility] [address]  Phone: [phone]  Fax: [phone]    [facility] (Jasmin Munroe)  [address] [place] [address] [phone]  Fax [phone]    [facility]- (Ruddy Mansfield)  [address]   [place] [address]  Phone: [phone]  Fax: [phone]    [facility] (Deborah Bonilla)  [address] [place] [address]  Phone: [phone]  Fax: [phone]

## (undated) DEVICE — KENDALL RADIOLUCENT FOAM MONITORING ELECTRODE RECTANGULAR SHAPE: Brand: KENDALL

## (undated) DEVICE — CONNECTOR TBNG OD5-7MM O2 END DISP

## (undated) DEVICE — CANNULA NSL ORAL AD FOR CAPNOFLEX CO2 O2 AIRLFE